# Patient Record
Sex: MALE | Race: WHITE | NOT HISPANIC OR LATINO | Employment: FULL TIME | URBAN - METROPOLITAN AREA
[De-identification: names, ages, dates, MRNs, and addresses within clinical notes are randomized per-mention and may not be internally consistent; named-entity substitution may affect disease eponyms.]

---

## 2017-08-04 ENCOUNTER — OFFICE VISIT (OUTPATIENT)
Dept: ORTHOPEDIC SURGERY | Facility: CLINIC | Age: 57
End: 2017-08-04

## 2017-08-04 VITALS — WEIGHT: 208 LBS | TEMPERATURE: 98.3 F | BODY MASS INDEX: 27.57 KG/M2 | HEIGHT: 73 IN

## 2017-08-04 DIAGNOSIS — M25.521 RIGHT ELBOW PAIN: Primary | ICD-10-CM

## 2017-08-04 DIAGNOSIS — M19.90 ARTHRITIS: ICD-10-CM

## 2017-08-04 PROCEDURE — 99203 OFFICE O/P NEW LOW 30 MIN: CPT | Performed by: ORTHOPAEDIC SURGERY

## 2017-08-04 PROCEDURE — 73070 X-RAY EXAM OF ELBOW: CPT | Performed by: ORTHOPAEDIC SURGERY

## 2017-08-04 RX ORDER — MELOXICAM 15 MG/1
TABLET ORAL
Qty: 30 TABLET | Refills: 3 | OUTPATIENT
Start: 2017-08-04 | End: 2019-10-12

## 2019-10-12 ENCOUNTER — HOSPITAL ENCOUNTER (EMERGENCY)
Facility: HOSPITAL | Age: 59
Discharge: HOME | End: 2019-10-12
Attending: EMERGENCY MEDICINE | Admitting: EMERGENCY MEDICINE

## 2019-10-12 VITALS
OXYGEN SATURATION: 99 % | BODY MASS INDEX: 26.51 KG/M2 | TEMPERATURE: 97.9 F | SYSTOLIC BLOOD PRESSURE: 125 MMHG | WEIGHT: 200 LBS | HEART RATE: 66 BPM | HEIGHT: 73 IN | RESPIRATION RATE: 16 BRPM | DIASTOLIC BLOOD PRESSURE: 90 MMHG

## 2019-10-12 DIAGNOSIS — S81.812A LACERATION OF LEFT LOWER EXTREMITY, INITIAL ENCOUNTER: Primary | ICD-10-CM

## 2019-10-12 PROCEDURE — 99282 EMERGENCY DEPT VISIT SF MDM: CPT

## 2019-10-12 PROCEDURE — 99283 EMERGENCY DEPT VISIT LOW MDM: CPT

## 2019-10-12 PROCEDURE — 90471 IMMUNIZATION ADMIN: CPT | Performed by: PHYSICIAN ASSISTANT

## 2019-10-12 PROCEDURE — 90715 TDAP VACCINE 7 YRS/> IM: CPT | Performed by: PHYSICIAN ASSISTANT

## 2019-10-12 PROCEDURE — 96372 THER/PROPH/DIAG INJ SC/IM: CPT

## 2019-10-12 PROCEDURE — 25010000002 TDAP 5-2.5-18.5 LF-MCG/0.5 SUSPENSION: Performed by: PHYSICIAN ASSISTANT

## 2019-10-12 RX ORDER — CEPHALEXIN 500 MG/1
500 CAPSULE ORAL 4 TIMES DAILY
Qty: 40 CAPSULE | Refills: 0 | Status: SHIPPED | OUTPATIENT
Start: 2019-10-12

## 2019-10-12 RX ORDER — ACETAMINOPHEN AND CODEINE PHOSPHATE 300; 30 MG/1; MG/1
1 TABLET ORAL EVERY 4 HOURS PRN
Qty: 15 TABLET | Refills: 0 | Status: SHIPPED | OUTPATIENT
Start: 2019-10-12

## 2019-10-12 RX ORDER — LIDOCAINE HYDROCHLORIDE AND EPINEPHRINE 10; 10 MG/ML; UG/ML
10 INJECTION, SOLUTION INFILTRATION; PERINEURAL ONCE
Status: COMPLETED | OUTPATIENT
Start: 2019-10-12 | End: 2019-10-12

## 2019-10-12 RX ORDER — ONDANSETRON 4 MG/1
4 TABLET, FILM COATED ORAL EVERY 8 HOURS PRN
Qty: 15 TABLET | Refills: 0 | Status: SHIPPED | OUTPATIENT
Start: 2019-10-12

## 2019-10-12 RX ADMIN — LIDOCAINE HYDROCHLORIDE,EPINEPHRINE BITARTRATE 10 ML: 10; .01 INJECTION, SOLUTION INFILTRATION; PERINEURAL at 11:35

## 2019-10-12 RX ADMIN — TETANUS TOXOID, REDUCED DIPHTHERIA TOXOID AND ACELLULAR PERTUSSIS VACCINE, ADSORBED 0.5 ML: 5; 2.5; 8; 8; 2.5 SUSPENSION INTRAMUSCULAR at 11:35

## 2019-10-12 NOTE — ED PROVIDER NOTES
EMERGENCY DEPARTMENT ENCOUNTER    CHIEF COMPLAINT  Chief Complaint: L calf laceration  History given by: Pt  History limited by: nothing  Room Number: 03/03  PMD: Christine Porter MD      HPI:  Pt is a 59 y.o. male who presents complaining of a laceration to his L calf since earlier today.  Pt states that he was trying to hook up a tractor when he fell off and caught his L calf on a bolt.  Patient has been able to ambulate normally since the accident.  Tdap is not up to date.  He denies alleviating factors and states the pain is worse with movement.      Duration:  Since earlier today  Onset: sudden  Timing: constant  Location: L calf  Intensity/Severity: moderate  Progression: unchanged  Aggravating Factors: movement  Alleviating Factors: none    PAST MEDICAL HISTORY  Active Ambulatory Problems     Diagnosis Date Noted   • No Active Ambulatory Problems     Resolved Ambulatory Problems     Diagnosis Date Noted   • No Resolved Ambulatory Problems     No Additional Past Medical History       PAST SURGICAL HISTORY  Past Surgical History:   Procedure Laterality Date   • EAR TUBES     • HERNIA REPAIR         FAMILY HISTORY  History reviewed. No pertinent family history.    SOCIAL HISTORY  Social History     Socioeconomic History   • Marital status:      Spouse name: Not on file   • Number of children: Not on file   • Years of education: Not on file   • Highest education level: Not on file   Tobacco Use   • Smoking status: Never Smoker   • Smokeless tobacco: Never Used   Substance and Sexual Activity   • Alcohol use: No     Frequency: Never   • Drug use: Defer   • Sexual activity: Defer       ALLERGIES  Patient has no known allergies.    REVIEW OF SYSTEMS  Review of Systems   Constitutional: Negative for chills and fever.   Respiratory: Negative for shortness of breath.    Cardiovascular: Negative for chest pain.   Gastrointestinal: Negative for nausea and vomiting.   Musculoskeletal: Positive for gait problem.    Skin: Positive for wound ( L calf, laceration).   Neurological: Negative for weakness and numbness.       PHYSICAL EXAM  ED Triage Vitals [10/12/19 1035]   Temp Heart Rate Resp BP SpO2   97.9 °F (36.6 °C) 66 16 -- 99 %      Temp src Heart Rate Source Patient Position BP Location FiO2 (%)   Tympanic -- -- -- --       Physical Exam   Constitutional: He is oriented to person, place, and time. No distress.   HENT:   Head: Normocephalic and atraumatic.   Eyes: EOM are normal.   Neck: Normal range of motion. Neck supple.   Cardiovascular: Normal rate, regular rhythm and normal heart sounds.   Pulmonary/Chest: Effort normal. No respiratory distress. He has no decreased breath sounds. He has no wheezes. He has no rhonchi. He has no rales.   Musculoskeletal: Normal range of motion. He exhibits no edema.   Laceration to L superior posterior calf.  No obvious tendon injury.  His calf is soft and nontender.  Neurovascularly intact distally.  Painless plantar and dorsi flexion of foot.       Neurological: He is alert and oriented to person, place, and time. He has normal sensation and normal strength.   Skin: Skin is warm and dry.   Psychiatric: Mood and affect normal.   Nursing note and vitals reviewed.        PROCEDURES  Laceration Repair  Date/Time: 10/12/2019 11:18 AM  Performed by: Yusuf Montoya PA  Authorized by: Chris Yi MD     Consent:     Consent obtained:  Verbal    Consent given by:  Patient    Risks discussed:  Infection, pain, poor cosmetic result and poor wound healing  Anesthesia (see MAR for exact dosages):     Anesthesia method:  Local infiltration    Local anesthetic:  Lidocaine 1% WITH epi  Laceration details:     Location:  Leg    Leg location:  L lower leg    Length (cm):  11.2  Repair type:     Repair type:  Simple  Pre-procedure details:     Preparation:  Patient was prepped and draped in usual sterile fashion  Exploration:     Hemostasis achieved with:  Epinephrine    Wound exploration:  wound explored through full range of motion and entire depth of wound probed and visualized      Wound extent: no areolar tissue violation noted, no fascia violation noted, no foreign bodies/material noted, no muscle damage noted, no nerve damage noted, no tendon damage noted, no underlying fracture noted and no vascular damage noted    Treatment:     Area cleansed with:  Hibiclens    Amount of cleaning:  Extensive    Irrigation solution:  Sterile saline    Irrigation method:  Syringe  Skin repair:     Repair method:  Sutures    Suture size:  3-0    Suture material:  Nylon    Suture technique:  Simple interrupted    Number of sutures:  15  Approximation:     Approximation:  Close  Post-procedure details:     Dressing:  Sterile dressing    Patient tolerance of procedure:  Tolerated well, no immediate complications            PROGRESS AND CONSULTS     1145: Discussed pt's case with Dr. Kd MD.  After bedside evaluation, they agree with the plan of care. Rechecked the patient who is resting comfortably and in NAD. Patient is stable.  Discussed the plan for discharge with a prescription for Keflex and Zofran.  Gave instructions to f/u with PCP for further evaluation and management. Strict RTER warnings given. Pt understands and agrees with the plan, all questions answered.      MEDICAL DECISION MAKING  Results were reviewed/discussed with the patient and they were also made aware of online access. Pt also made aware that some labs, such as cultures, will not be resulted during ER visit and follow up with PMD is necessary.     MDM       DIAGNOSIS  Final diagnoses:   Laceration of left lower extremity, initial encounter       DISPOSITION  DISCHARGE    Patient discharged in stable condition.    Reviewed implications of results, diagnosis, meds, responsibility to follow up, warning signs and symptoms of possible worsening, potential complications and reasons to return to ER.    Patient/Family voiced understanding of  above instructions.    Discussed plan for discharge, as there is no emergent indication for admission. Patient referred to primary care provider for BP management due to today's BP. Pt/family is agreeable and understands need for follow up and repeat testing.  Pt is aware that discharge does not mean that nothing is wrong but it indicates no emergency is present that requires admission and they must continue care with follow-up as given below or physician of their choice.     FOLLOW-UP  Christine Porter MD  [address] [facility] [address] [phone]      for suture removal in 14 days         Medication List      New Prescriptions    acetaminophen-codeine 300-30 MG per tablet  Commonly known as:  TYLENOL #3  Take 1 tablet by mouth Every 4 (Four) Hours As Needed for Moderate Pain .     cephalexin 500 MG capsule  Commonly known as:  KEFLEX  Take 1 capsule by mouth 4 (Four) Times a Day.     ondansetron 4 MG tablet  Commonly known as:  ZOFRAN  Take 1 tablet by mouth Every 8 (Eight) Hours As Needed for Nausea or   Vomiting.        Stop    meloxicam 15 MG tablet  Commonly known as:  MOBIC              Latest Documented Vital Signs:  As of 11:56 AM  BP- 125/90 HR- 66 Temp- 97.9 °F (36.6 °C) (Tympanic) O2 sat- 99%    --  Documentation assistance provided by velasquez Murillo for JASMIN Altamirano.  Information recorded by the scribe was done at my direction and has been verified and validated by me.         Andreas Murillo  10/12/19 1157       Yusuf Montoya PA  10/12/19 2012

## 2019-10-12 NOTE — ED PROVIDER NOTES
The patient presents complaining of a left leg laceration that occurred when the pt became off balance and fell at work, scraping the back of the knee on a nail at work.     Physical Exam:  Patient is nontoxic appearing and in NAD. The pt is alert and oriented X 3.  Lungs/cardiovascular: Good distal pulses.   Back/extremities: FROM, bandage to left knee, normal tenderness in LLE, normal sensation  Skin: warm and dry    Plan: Lac repaired by PA. Discussed plan to discharge the pt. Pt understands and agrees with the plan. All questions have been answered.      MD ATTESTATION NOTE    The MELY and I have discussed this patient's history, physical exam, and treatment plan.  I have reviewed the documentation and personally had a face to face interaction with the patient. I affirm the documentation and agree with the treatment and plan.  The attached note describes my personal findings.    Documentation assistance provided by velasquez Hough for Dr Yi. Information recorded by the scribe was done at my direction and has been verified and validated by me.     Cinthya Hough  10/12/19 0643       Chris Yi MD  10/12/19 [id number]

## 2020-10-23 ENCOUNTER — OFFICE VISIT (OUTPATIENT)
Dept: ORTHOPEDIC SURGERY | Facility: CLINIC | Age: 60
End: 2020-10-23

## 2020-10-23 VITALS — WEIGHT: 200.1 LBS | HEIGHT: 73 IN | BODY MASS INDEX: 26.52 KG/M2 | TEMPERATURE: 98.9 F

## 2020-10-23 DIAGNOSIS — M72.2 PLANTAR FASCIITIS: ICD-10-CM

## 2020-10-23 DIAGNOSIS — M25.562 LEFT KNEE PAIN, UNSPECIFIED CHRONICITY: Primary | ICD-10-CM

## 2020-10-23 DIAGNOSIS — M79.672 LEFT FOOT PAIN: ICD-10-CM

## 2020-10-23 PROCEDURE — 99203 OFFICE O/P NEW LOW 30 MIN: CPT | Performed by: ORTHOPAEDIC SURGERY

## 2020-10-23 PROCEDURE — 73630 X-RAY EXAM OF FOOT: CPT | Performed by: ORTHOPAEDIC SURGERY

## 2020-10-23 RX ORDER — MELOXICAM 15 MG/1
TABLET ORAL
Qty: 30 TABLET | Refills: 0 | Status: SHIPPED | OUTPATIENT
Start: 2020-10-23

## 2020-10-23 NOTE — PROGRESS NOTES
New foot      Patient: Samuel Luo        YOB: 1960    Medical Record Number: [id number]        Chief Complaints: left foot pain    History of Present Illness: This is a 60-year-old male who initially was on a schedule for what his wife said was knee pain however he states is not his knee as his foot his left foot near his heel been ongoing for almost a year he cut the back of his knee on a bolt and states following that he developed heel pain is been persistent.  Symptoms are moderate constant stabbing aching worse with activity somewhat better with rest he is an  past medical history is unremarkable        Allergies: No Known Allergies    Medications:   Home Medications:  Current Outpatient Medications on File Prior to Visit   Medication Sig   • acetaminophen-codeine (TYLENOL #3) 300-30 MG per tablet Take 1 tablet by mouth Every 4 (Four) Hours As Needed for Moderate Pain .   • cephalexin (KEFLEX) 500 MG capsule Take 1 capsule by mouth 4 (Four) Times a Day.   • Multiple Vitamin (MULTI VITAMIN DAILY PO) Take  by mouth.   • ondansetron (ZOFRAN) 4 MG tablet Take 1 tablet by mouth Every 8 (Eight) Hours As Needed for Nausea or Vomiting.     No current facility-administered medications on file prior to visit.      Current Medications:  Scheduled Meds:  Continuous Infusions:No current facility-administered medications for this visit.     PRN Meds:.    No past medical history on file.     Past Surgical History:   Procedure Laterality Date   • EAR TUBES     • HERNIA REPAIR          Social History     Occupational History   • Not on file   Tobacco Use   • Smoking status: Never Smoker   • Smokeless tobacco: Never Used   Substance and Sexual Activity   • Alcohol use: No     Frequency: Never   • Drug use: Defer   • Sexual activity: Defer      Social History     Social History Narrative   • Not on file      No family history on file.          Review of Systems: 14 point review of systems are  "remarkable for the pertinent positives listed in the chart by the patient the remainder negative    Review of Systems      Physical Exam: 60 y.o. male  General Appearance:    Alert, cooperative, in no acute distress                   Vitals:    10/23/20 0935   Temp: 98.9 °F (37.2 °C)   TempSrc: Temporal   Weight: 90.8 kg (200 lb 1.6 oz)   Height: 185.4 cm (73\")      Patient is alert and read ×3 no acute distress appears her above-listed at height weight and age.  Affect is normal respiratory rate is normal unlabored. Heart rate regular rate rhythm, sclera, dentition and hearing are normal for the purpose of this exam.        Ortho Exam exam of the left heel he has palpable tenderness over the origin of the plantar fascia is tight heel cords bilaterally, neutral position with double leg standing otherwise a normal ankle exam he is good peroneal posterior tib function to resisted testing and stretching    Procedures             Radiology:   AP, Lateral and oblique of the left foot were taken to evaluate his symptoms have no comparative films these are normal he does have a anterior calcaneal spur nothing that looks acute  Imaging Results (Most Recent)     Procedure Component Value Units Date/Time    XR Foot 3+ View Left [[id number]] Resulted: 10/23/20 0929     Updated: 10/23/20 0930    Impression:      Ordering physician's impression is located in the Encounter Note dated 10/23/20. X-ray performed in the DR room.          Assessment/Plan:      Left foot pain/heel pain has been ongoing for almost a year I think this is plantar fasciitis which I discussed with him in detail.  I think he benefit from a good stretching program which I showed him I have will have him get some heel inserts and I will give him a night splint to see if we can get this calmed down.  If we do not might pursue other means of testing and then a referral to Dr. Simms will also start him on a short course of anti-inflammatories with strict " precautions, Mobic